# Patient Record
Sex: FEMALE | Race: WHITE | NOT HISPANIC OR LATINO | Employment: OTHER | URBAN - NONMETROPOLITAN AREA
[De-identification: names, ages, dates, MRNs, and addresses within clinical notes are randomized per-mention and may not be internally consistent; named-entity substitution may affect disease eponyms.]

---

## 2017-04-24 ENCOUNTER — TELEPHONE (OUTPATIENT)
Dept: GASTROENTEROLOGY | Facility: CLINIC | Age: 57
End: 2017-04-24

## 2017-05-03 ENCOUNTER — LAB (OUTPATIENT)
Dept: LAB | Facility: HOSPITAL | Age: 57
End: 2017-05-03
Attending: INTERNAL MEDICINE

## 2017-05-03 DIAGNOSIS — R79.89 ELEVATED LIVER FUNCTION TESTS: ICD-10-CM

## 2017-05-03 LAB
ALBUMIN SERPL-MCNC: 4.1 G/DL (ref 3.5–5)
ALBUMIN/GLOB SERPL: 1.2 G/DL (ref 1.1–2.5)
ALP SERPL-CCNC: 125 U/L (ref 24–120)
ALT SERPL W P-5'-P-CCNC: 48 U/L (ref 0–54)
ANION GAP SERPL CALCULATED.3IONS-SCNC: 11 MMOL/L (ref 4–13)
AST SERPL-CCNC: 41 U/L (ref 7–45)
BILIRUB SERPL-MCNC: 0.7 MG/DL (ref 0.1–1)
BUN BLD-MCNC: 13 MG/DL (ref 5–21)
BUN/CREAT SERPL: 18.1 (ref 7–25)
CALCIUM SPEC-SCNC: 9.6 MG/DL (ref 8.4–10.4)
CHLORIDE SERPL-SCNC: 106 MMOL/L (ref 98–110)
CO2 SERPL-SCNC: 27 MMOL/L (ref 24–31)
CREAT BLD-MCNC: 0.72 MG/DL (ref 0.5–1.4)
GFR SERPL CREATININE-BSD FRML MDRD: 84 ML/MIN/1.73
GLOBULIN UR ELPH-MCNC: 3.4 GM/DL
GLUCOSE BLD-MCNC: 87 MG/DL (ref 70–100)
POTASSIUM BLD-SCNC: 4.3 MMOL/L (ref 3.5–5.3)
PROT SERPL-MCNC: 7.5 G/DL (ref 6.3–8.7)
SODIUM BLD-SCNC: 144 MMOL/L (ref 135–145)

## 2017-05-03 PROCEDURE — 36415 COLL VENOUS BLD VENIPUNCTURE: CPT

## 2017-05-03 PROCEDURE — 80053 COMPREHEN METABOLIC PANEL: CPT | Performed by: INTERNAL MEDICINE

## 2017-06-19 ENCOUNTER — OFFICE VISIT (OUTPATIENT)
Dept: GASTROENTEROLOGY | Facility: CLINIC | Age: 57
End: 2017-06-19

## 2017-06-19 ENCOUNTER — PREP FOR SURGERY (OUTPATIENT)
Dept: OTHER | Facility: HOSPITAL | Age: 57
End: 2017-06-19

## 2017-06-19 VITALS
BODY MASS INDEX: 27.64 KG/M2 | OXYGEN SATURATION: 98 % | SYSTOLIC BLOOD PRESSURE: 114 MMHG | HEART RATE: 75 BPM | DIASTOLIC BLOOD PRESSURE: 76 MMHG | WEIGHT: 172 LBS | HEIGHT: 66 IN | TEMPERATURE: 98.1 F

## 2017-06-19 DIAGNOSIS — R79.89 ABNORMAL LIVER FUNCTION TESTS: Primary | ICD-10-CM

## 2017-06-19 PROCEDURE — 99213 OFFICE O/P EST LOW 20 MIN: CPT | Performed by: INTERNAL MEDICINE

## 2017-06-19 NOTE — PROGRESS NOTES
Primary Physician: Mercedes Montilla DO    Chief Complaint   Patient presents with   • Follow-up     Patient is here today for 6 mo follow up.       Subjective     Mine Hitchcock is a 57 y.o. female.    HPI   Liver function tests  12/19/2016  Had abnormal liver function tests for at least the past years. Please see table below:  Results for MINE HITCHCOCK (MRN [id number]) as of 12/19/2016 13:56    Ref. Range 10/26/2015 10:39 12/21/2015 08:05 8/5/2016 07:06 8/26/2016 10:22 10/5/2016 09:46   AST (SGOT) Latest Ref Range: 7 - 45 U/L 39 31 46 (H) 38 35   ALT (SGPT) Latest Ref Range: 0 - 54 U/L 49 38 56 (H) 44 64 (H)   Total Bilirubin Latest Ref Range: 0.1 - 1.0 mg/dL 0.7 0.4 0.5 0.9 0.5         She tells me she had an ultrasound done in summer of 2016. Results are as noted below:  EXAMINATION- US LIVER- 8/5/2016 8-08 AM CDT      HISTORY- Hepatic cyst.      Images are obtained in transverse and longitudinal direction in the  usual manner. The pancreas is well demonstrated and unremarkable.      Inferior vena cava is visualized. The gallbladder is surgically absent.  A small 1 cm hepatic cyst is noted. The common duct is 6 mm.      IMPRESSION-  Incidental note is made of a 1 cm hepatic cyst. Otherwise, the liver is  Unremarkable.     Liver cyst was seen on imaging in the past as well. She had an ultrasound done 6/25/2014 that also shows this liver cyst. CT imaging done on 07/29/2016 also confirms this cyst. There is nothing else unusual. He viewed records from 2014. She had a negative JOANA, AMA, smooth muscle antibody hepatitis, ABC serology, ferritin, ceruloplasmin. She denies any alcohol. There are no family members with liver problems. There is been no blood transfusions or tattoos. She denies any high risk sexual behavior.    06/19/17   She is here to follow-up on her abnormal liver function tests.  She has had additional blood work since her last office visit with results as noted below:  Results for MINE HITCHCOCK  (MRN [id number]) as of 6/19/2017 14:31   Ref. Range 12/21/2015 08:05 8/5/2016 07:06 8/26/2016 10:22 10/5/2016 09:46 5/3/2017 07:52   ALT (SGPT) Latest Ref Range: 0 - 54 U/L 38 56 (H) 44 64 (H) 48   AST (SGOT) Latest Ref Range: 7 - 45 U/L 31 46 (H) 38 35 41   Total Bilirubin Latest Ref Range: 0.1 - 1.0 mg/dL 0.4 0.5 0.9 0.5 0.7      She is doing well this point.  There is no right upper quadrant pain.  There is no nausea or vomiting.  She is status post cholecystectomy.  Her liver tests are now normal as noted above.      Past Medical History:   Diagnosis Date   • Abdominal pain, acute, right lower quadrant    • Abdominal pain, acute, right upper quadrant    • Constipation    • Elevated liver function tests    • GERD (gastroesophageal reflux disease)    • H/O chest pain    • H/O failed moderate sedation    • Hyperlipidemia    • Melanoma     skin   • Transaminitis        Past Surgical History:   Procedure Laterality Date   • CHOLECYSTECTOMY     • COLONOSCOPY  08/10/2016    asc adenomatous polyp   • EYE SURGERY     • SKIN CANCER EXCISION Right     Right leg melanoma   • TUBAL ABDOMINAL LIGATION     • UPPER GASTROINTESTINAL ENDOSCOPY  06/04/2015    fastric erosions, SUNITA bx per protocol neg        Current Outpatient Prescriptions:   •  calcium carbonate (OS-TATIANA) 600 MG tablet, Take 600 mg by mouth Daily., Disp: , Rfl:   •  Cholecalciferol (VITAMIN D3) 2000 UNITS capsule, Take 2,000 Units by mouth Daily., Disp: , Rfl:   •  DIGESTIVE ENZYMES PO, Take  by mouth., Disp: , Rfl:   •  magnesium gluconate (MAGONATE) 500 MG tablet, Take 500 mg by mouth 2 (Two) Times a Day., Disp: , Rfl:   •  Multiple Vitamins-Minerals (ICAPS AREDS FORMULA PO), Take  by mouth Daily., Disp: , Rfl:   •  Multiple Vitamins-Minerals (MULTIVITAMIN ADULT PO), Take  by mouth Daily., Disp: , Rfl:   •  Probiotic Product (PROBIOTIC DAILY PO), Take  by mouth., Disp: , Rfl:     Allergies   Allergen Reactions   • Omeprazole    • Other      ULCER DRUGS  "      Social History     Social History   • Marital status:      Spouse name: N/A   • Number of children: N/A   • Years of education: N/A     Occupational History   • Not on file.     Social History Main Topics   • Smoking status: Never Smoker   • Smokeless tobacco: Never Used   • Alcohol use No   • Drug use: No   • Sexual activity: Not on file     Other Topics Concern   • Not on file     Social History Narrative       Family History   Problem Relation Age of Onset   • Colon polyps Mother 60   • Lung cancer Father 77   • Colon cancer Neg Hx    • Esophageal cancer Neg Hx    • Liver cancer Neg Hx    • Liver disease Neg Hx    • Rectal cancer Neg Hx        Review of Systems   Constitutional: Negative for fever.   Respiratory: Negative for cough and shortness of breath.    Cardiovascular: Negative for chest pain.   Genitourinary: Negative for dysuria.   Skin: Negative for rash.   Neurological: Negative for seizures.       Objective     /76  Pulse 75  Temp 98.1 °F (36.7 °C)  Ht 66\" (167.6 cm)  Wt 172 lb (78 kg)  SpO2 98%  BMI 27.76 kg/m2    Physical Exam   Constitutional: She is oriented to person, place, and time. She appears well-developed.   Eyes: No scleral icterus.   Cardiovascular: Normal rate and regular rhythm.    Pulmonary/Chest: Breath sounds normal.   Abdominal: Soft. Bowel sounds are normal. She exhibits no distension and no mass. There is no tenderness. There is no rebound and no guarding.   Musculoskeletal: Normal range of motion.   Neurological: She is alert and oriented to person, place, and time.   Skin: No rash noted.   Psychiatric: She has a normal mood and affect. Her behavior is normal.   Vitals reviewed.      Lab Results   Component Value Date    WBC 7.13 10/05/2016    WBC 3.84 (L) 10/26/2015    WBC 3.57 (L) 08/03/2015    HGB 15.2 10/05/2016    HGB 14.6 10/26/2015    HGB 15.2 08/03/2015    HCT 43.7 10/05/2016    HCT 40.4 10/26/2015    HCT 43.1 08/03/2015     10/05/2016    "  10/26/2015     08/03/2015        Lab Results   Component Value Date     05/03/2017     10/05/2016     (H) 08/05/2016    K 4.3 05/03/2017    K 4.4 10/05/2016    K 3.9 08/05/2016     05/03/2017     10/05/2016     08/05/2016    CO2 27.0 05/03/2017    CO2 30.0 10/05/2016    CO2 29 08/05/2016    BUN 13 05/03/2017    BUN 18 10/05/2016    BUN 15 08/05/2016    CREATININE 0.72 05/03/2017    CREATININE 0.66 10/05/2016    CREATININE 0.72 08/05/2016    BILITOT 0.7 05/03/2017    BILITOT 0.5 10/05/2016    BILITOT 0.9 08/26/2016    ALKPHOS 125 (H) 05/03/2017    ALKPHOS 91 10/05/2016    ALKPHOS 98 08/26/2016    ALT 48 05/03/2017    ALT 64 (H) 10/05/2016    ALT 44 08/26/2016    AST 41 05/03/2017    AST 35 10/05/2016    AST 38 08/26/2016    GLUCOSE 87 05/03/2017    GLUCOSE 89 10/05/2016    GLUCOSE 92 08/05/2016       Lab Results   Component Value Date    INR 0.95 11/04/2014    INR 0.85 (L) 06/23/2014       IMPRESSION/PLAN:  Miladys was seen today for follow-up.    Diagnoses and all orders for this visit:    Abnormal liver function tests  There now resolved.  Last test, they were all normal.  A full autoimmune serological evaluation was negative.  She has a liver cyst on imaging that requires no further follow-up.  The patient is to follow-up with me as needed.  She is on recall to have a colonoscopy repeated in the future.           Sheila Hanson MD  06/19/17  2:35 PM    Much of this encounter note is an electronic transcription/translation of spoken language to printed text. The electronic translation of spoken language may permit erroneous, or at times, nonsensical words or phrases to be inadvertently transcribed; although I have reviewed the note for such errors, some may still exist.

## 2017-06-26 ENCOUNTER — OFFICE VISIT (OUTPATIENT)
Dept: OBSTETRICS AND GYNECOLOGY | Facility: CLINIC | Age: 57
End: 2017-06-26

## 2017-06-26 VITALS
HEIGHT: 66 IN | SYSTOLIC BLOOD PRESSURE: 110 MMHG | BODY MASS INDEX: 27.32 KG/M2 | DIASTOLIC BLOOD PRESSURE: 68 MMHG | WEIGHT: 170 LBS

## 2017-06-26 DIAGNOSIS — Z01.419 WELL WOMAN EXAM WITH ROUTINE GYNECOLOGICAL EXAM: Primary | ICD-10-CM

## 2017-06-26 LAB
GEN CATEG CVX/VAG CYTO-IMP: NORMAL
LAB AP CASE REPORT: NORMAL
Lab: NORMAL
PATH INTERP SPEC-IMP: NORMAL
STAT OF ADQ CVX/VAG CYTO-IMP: NORMAL

## 2017-06-26 PROCEDURE — G0123 SCREEN CERV/VAG THIN LAYER: HCPCS | Performed by: NURSE PRACTITIONER

## 2017-06-26 PROCEDURE — 99396 PREV VISIT EST AGE 40-64: CPT | Performed by: NURSE PRACTITIONER

## 2017-06-26 NOTE — PROGRESS NOTES
"      Miladys Hitchcock is a 57 y.o. No obstetric history on file.     Chief Complaint   Patient presents with   • Gynecologic Exam     Pt is here today for yearly with no c/o           HPI - PCP is Dr. Montilla.  Miladys has no gyn problems. She does not take HRT.  She has a history of elevated liver enzymes but that has resolved, she saw Dr. Trinh Hanson re: this and constipation.  She has bone loss and is seen at the [facility] at the [facility]. Her paps have all been normal.      The following portions of the patient's history were reviewed and updated as appropriate:vital signs, allergies, current medications, past family history, past medical history, past social history, past surgical history and problem list.    Review of Systems   Constitutional: Negative for activity change, chills and fatigue.   HENT: Negative for congestion, drooling, facial swelling, nosebleeds and sinus pressure.    Eyes: Negative for redness and itching.   Respiratory: Negative for apnea, choking and shortness of breath.    Cardiovascular: Negative for chest pain and leg swelling.   Gastrointestinal: Positive for constipation. Negative for abdominal pain and nausea.   Endocrine: Negative for cold intolerance and polydipsia.   Genitourinary: Negative for decreased urine volume, difficulty urinating, dysuria, flank pain and hematuria.   Musculoskeletal: Positive for arthralgias and joint swelling.   Skin: Negative for color change and pallor.        History of melanoma  8 years ago      Neurological: Negative for dizziness, speech difficulty and light-headedness.        Bilateral neuropathy  In legs  Unknown etiology   Psychiatric/Behavioral: Negative for agitation, confusion and hallucinations.     Breast ROS: negative    Objective      /68 (BP Location: Right arm, Patient Position: Sitting, Cuff Size: Adult)  Ht 66\" (167.6 cm)  Wt 170 lb (77.1 kg)  Breastfeeding? No  BMI 27.44 kg/m2      Physical Exam "   Constitutional: She is oriented to person, place, and time. She appears well-developed and well-nourished.   HENT:   Head: Normocephalic and atraumatic.   Eyes: Conjunctivae and EOM are normal. Pupils are equal, round, and reactive to light. Right eye exhibits no discharge. Left eye exhibits no discharge. No scleral icterus.   Neck: Normal range of motion. Neck supple. No thyromegaly present.   Cardiovascular: Normal rate and regular rhythm.    No murmur heard.  Pulmonary/Chest: Effort normal and breath sounds normal. No respiratory distress. She has no wheezes. Right breast exhibits no inverted nipple, no mass and no nipple discharge. Left breast exhibits no inverted nipple, no mass and no nipple discharge.   Abdominal: Soft. She exhibits no distension and no mass. There is no tenderness. There is no guarding. Hernia confirmed negative in the right inguinal area and confirmed negative in the left inguinal area.   Genitourinary: Rectal exam shows no mass. There is no rash, tenderness or lesion on the right labia. There is no rash, tenderness or lesion on the left labia. Uterus is not deviated, not enlarged, not fixed and not tender. Cervix exhibits no motion tenderness, no discharge and no friability. Right adnexum displays no mass and no tenderness. Left adnexum displays no mass and no tenderness. No erythema, tenderness or bleeding in the vagina. No foreign body in the vagina. No signs of injury around the vagina. No vaginal discharge found.   Genitourinary Comments: B/S/U  Without lesions  Urethra  Normal  Perineum  Normal  Urethral meatus  Normal  Vagina  Normal, no lesions, mild cystocele  Rectum  No masses  Bladder  nontender   Musculoskeletal: She exhibits no edema or deformity.   Neurological: She is alert and oriented to person, place, and time.   Skin: Skin is warm and dry.   Psychiatric: She has a normal mood and affect. Her behavior is normal. Her mood appears not anxious. Her affect is not blunt, not  labile and not inappropriate. She does not exhibit a depressed mood.   Nursing note and vitals reviewed.       Assessment/Plan     Miladys was seen today for gynecologic exam.      Diagnoses and all orders for this visit:    Well woman exam with routine gynecological exam  -     Liquid-based Pap Smear, Screening    Patient has mammogram order for J[facility] in July and a followup at the [facility] re: bone loss.  At this time she is only taking Vit. D 5,000 IU's and calcium.    Patient is counseled re: BSE, diet, exercise, calcium and Vit D3.      Fely Hamilton, APRN  6/26/2017

## 2020-10-05 ENCOUNTER — NEW REFERRAL (OUTPATIENT)
Dept: URBAN - METROPOLITAN AREA CLINIC 26 | Facility: CLINIC | Age: 60
End: 2020-10-05

## 2020-10-05 VITALS
HEIGHT: 66 IN | DIASTOLIC BLOOD PRESSURE: 86 MMHG | SYSTOLIC BLOOD PRESSURE: 113 MMHG | WEIGHT: 185 LBS | BODY MASS INDEX: 29.73 KG/M2 | HEART RATE: 76 BPM

## 2020-10-05 DIAGNOSIS — H43.393: ICD-10-CM

## 2020-10-05 DIAGNOSIS — H35.3132: ICD-10-CM

## 2020-10-05 DIAGNOSIS — H02.834: ICD-10-CM

## 2020-10-05 DIAGNOSIS — H04.123: ICD-10-CM

## 2020-10-05 DIAGNOSIS — H02.831: ICD-10-CM

## 2020-10-05 DIAGNOSIS — H35.342: ICD-10-CM

## 2020-10-05 DIAGNOSIS — H26.491: ICD-10-CM

## 2020-10-05 DIAGNOSIS — H40.023: ICD-10-CM

## 2020-10-05 PROCEDURE — 99204 OFFICE O/P NEW MOD 45 MIN: CPT

## 2020-10-05 PROCEDURE — 92235 FLUORESCEIN ANGRPH MLTIFRAME: CPT

## 2020-10-05 PROCEDURE — 92133 CPTRZD OPH DX IMG PST SGM ON: CPT

## 2020-10-05 PROCEDURE — 76514 ECHO EXAM OF EYE THICKNESS: CPT

## 2020-10-05 PROCEDURE — 92250 FUNDUS PHOTOGRAPHY W/I&R: CPT

## 2020-10-05 ASSESSMENT — PACHYMETRY
OD_CT_UM: -4
OS_CT_UM: -6

## 2020-10-05 ASSESSMENT — VISUAL ACUITY
OS_SC: 20/20
OD_SC: 20/20-1

## 2020-10-05 ASSESSMENT — TONOMETRY
OS_IOP_MMHG: 18
OD_IOP_MMHG: 16

## 2021-01-05 ENCOUNTER — FOLLOW UP (OUTPATIENT)
Dept: URBAN - METROPOLITAN AREA CLINIC 26 | Facility: CLINIC | Age: 61
End: 2021-01-05

## 2021-01-05 DIAGNOSIS — H40.023: ICD-10-CM

## 2021-01-05 DIAGNOSIS — H43.393: ICD-10-CM

## 2021-01-05 DIAGNOSIS — H35.3132: ICD-10-CM

## 2021-01-05 PROCEDURE — 92134 CPTRZ OPH DX IMG PST SGM RTA: CPT

## 2021-01-05 PROCEDURE — 92014 COMPRE OPH EXAM EST PT 1/>: CPT

## 2021-01-05 PROCEDURE — 92250 FUNDUS PHOTOGRAPHY W/I&R: CPT

## 2021-01-05 ASSESSMENT — TONOMETRY
OS_IOP_MMHG: 15
OD_IOP_MMHG: 13

## 2021-01-05 ASSESSMENT — VISUAL ACUITY
OS_SC: 20/20
OD_SC: 20/20

## 2021-09-16 ENCOUNTER — TELEPHONE (OUTPATIENT)
Dept: GASTROENTEROLOGY | Facility: CLINIC | Age: 61
End: 2021-09-16

## 2021-09-16 NOTE — TELEPHONE ENCOUNTER
I had sent pt a letter re: recall colon-it was returned. I tried to call today to discuss-was unable to reach pt so I left VM asking for pt to call me back.

## 2021-10-12 ENCOUNTER — FOLLOW UP (OUTPATIENT)
Dept: URBAN - METROPOLITAN AREA CLINIC 26 | Facility: CLINIC | Age: 61
End: 2021-10-12

## 2021-10-12 DIAGNOSIS — H04.123: ICD-10-CM

## 2021-10-12 DIAGNOSIS — H35.3132: ICD-10-CM

## 2021-10-12 DIAGNOSIS — H43.393: ICD-10-CM

## 2021-10-12 DIAGNOSIS — H40.023: ICD-10-CM

## 2021-10-12 PROCEDURE — 92014 COMPRE OPH EXAM EST PT 1/>: CPT

## 2021-10-12 PROCEDURE — 92250 FUNDUS PHOTOGRAPHY W/I&R: CPT

## 2021-10-12 PROCEDURE — 92134 CPTRZ OPH DX IMG PST SGM RTA: CPT

## 2021-10-12 ASSESSMENT — TONOMETRY
OS_IOP_MMHG: 15
OD_IOP_MMHG: 15

## 2021-10-12 ASSESSMENT — VISUAL ACUITY
OS_SC: 20/25
OD_SC: 20/25+2

## 2022-07-12 ENCOUNTER — FOLLOW UP (OUTPATIENT)
Dept: URBAN - METROPOLITAN AREA CLINIC 26 | Facility: CLINIC | Age: 62
End: 2022-07-12

## 2022-07-12 VITALS — WEIGHT: 188 LBS | BODY MASS INDEX: 29.86 KG/M2 | HEIGHT: 66.5 IN

## 2022-07-12 DIAGNOSIS — H43.393: ICD-10-CM

## 2022-07-12 DIAGNOSIS — H04.123: ICD-10-CM

## 2022-07-12 DIAGNOSIS — H40.023: ICD-10-CM

## 2022-07-12 DIAGNOSIS — H35.3132: ICD-10-CM

## 2022-07-12 PROCEDURE — 92134 CPTRZ OPH DX IMG PST SGM RTA: CPT

## 2022-07-12 PROCEDURE — 92014 COMPRE OPH EXAM EST PT 1/>: CPT

## 2022-07-12 PROCEDURE — 92250 FUNDUS PHOTOGRAPHY W/I&R: CPT

## 2022-07-12 ASSESSMENT — TONOMETRY
OD_IOP_MMHG: 15
OS_IOP_MMHG: 15

## 2022-07-12 ASSESSMENT — VISUAL ACUITY
OS_SC: 20/25-2
OD_SC: 20/25+2

## 2023-08-01 ENCOUNTER — FOLLOW UP (OUTPATIENT)
Dept: URBAN - METROPOLITAN AREA CLINIC 26 | Facility: CLINIC | Age: 63
End: 2023-08-01

## 2023-08-01 VITALS — HEIGHT: 66.5 IN | WEIGHT: 175 LBS | BODY MASS INDEX: 27.79 KG/M2

## 2023-08-01 DIAGNOSIS — H35.3132: ICD-10-CM

## 2023-08-01 DIAGNOSIS — H43.393: ICD-10-CM

## 2023-08-01 DIAGNOSIS — H35.3123: ICD-10-CM

## 2023-08-01 DIAGNOSIS — H40.023: ICD-10-CM

## 2023-08-01 DIAGNOSIS — H04.123: ICD-10-CM

## 2023-08-01 PROCEDURE — 92014 COMPRE OPH EXAM EST PT 1/>: CPT

## 2023-08-01 PROCEDURE — 92134 CPTRZ OPH DX IMG PST SGM RTA: CPT

## 2023-08-01 PROCEDURE — 92250 FUNDUS PHOTOGRAPHY W/I&R: CPT

## 2023-08-01 ASSESSMENT — TONOMETRY
OD_IOP_MMHG: 14
OS_IOP_MMHG: 18

## 2023-08-01 ASSESSMENT — VISUAL ACUITY
OS_SC: 20/25-2
OD_SC: 20/20-1

## 2024-08-13 ENCOUNTER — FOLLOW UP (OUTPATIENT)
Dept: URBAN - METROPOLITAN AREA CLINIC 26 | Facility: CLINIC | Age: 64
End: 2024-08-13

## 2024-08-13 VITALS — BODY MASS INDEX: 29.38 KG/M2 | WEIGHT: 185 LBS | HEIGHT: 66.5 IN

## 2024-08-13 DIAGNOSIS — H43.393: ICD-10-CM

## 2024-08-13 DIAGNOSIS — H35.3123: ICD-10-CM

## 2024-08-13 DIAGNOSIS — H26.491: ICD-10-CM

## 2024-08-13 DIAGNOSIS — H02.834: ICD-10-CM

## 2024-08-13 DIAGNOSIS — H40.023: ICD-10-CM

## 2024-08-13 DIAGNOSIS — H04.123: ICD-10-CM

## 2024-08-13 DIAGNOSIS — H35.3132: ICD-10-CM

## 2024-08-13 DIAGNOSIS — H02.831: ICD-10-CM

## 2024-08-13 PROCEDURE — 92134 CPTRZ OPH DX IMG PST SGM RTA: CPT

## 2024-08-13 PROCEDURE — 92250 FUNDUS PHOTOGRAPHY W/I&R: CPT | Mod: 59

## 2024-08-13 PROCEDURE — 92014 COMPRE OPH EXAM EST PT 1/>: CPT

## 2024-08-13 ASSESSMENT — TONOMETRY
OS_IOP_MMHG: 11
OD_IOP_MMHG: 12

## 2024-08-13 ASSESSMENT — VISUAL ACUITY
OS_SC: 20/20-1
OD_SC: 20/20-2

## 2025-08-13 ENCOUNTER — FOLLOW UP (OUTPATIENT)
Age: 65
End: 2025-08-13

## 2025-08-13 VITALS — WEIGHT: 183 LBS | HEIGHT: 66 IN | BODY MASS INDEX: 29.41 KG/M2

## 2025-08-13 DIAGNOSIS — H35.3132: ICD-10-CM

## 2025-08-13 DIAGNOSIS — H43.393: ICD-10-CM

## 2025-08-13 DIAGNOSIS — H26.491: ICD-10-CM

## 2025-08-13 DIAGNOSIS — H04.123: ICD-10-CM

## 2025-08-13 DIAGNOSIS — H02.831: ICD-10-CM

## 2025-08-13 DIAGNOSIS — H02.834: ICD-10-CM

## 2025-08-13 DIAGNOSIS — H40.023: ICD-10-CM

## 2025-08-13 DIAGNOSIS — H35.3123: ICD-10-CM

## 2025-08-13 PROCEDURE — 92134 CPTRZ OPH DX IMG PST SGM RTA: CPT

## 2025-08-13 PROCEDURE — 92014 COMPRE OPH EXAM EST PT 1/>: CPT

## 2025-08-13 PROCEDURE — 92250 FUNDUS PHOTOGRAPHY W/I&R: CPT | Mod: 59
